# Patient Record
Sex: FEMALE | Race: WHITE | NOT HISPANIC OR LATINO | Employment: FULL TIME | URBAN - METROPOLITAN AREA
[De-identification: names, ages, dates, MRNs, and addresses within clinical notes are randomized per-mention and may not be internally consistent; named-entity substitution may affect disease eponyms.]

---

## 2022-01-21 ENCOUNTER — HOSPITAL ENCOUNTER (OUTPATIENT)
Facility: CLINIC | Age: 27
Setting detail: OBSERVATION
Discharge: HOME | End: 2022-01-22
Attending: EMERGENCY MEDICINE | Admitting: OBSTETRICS & GYNECOLOGY
Payer: COMMERCIAL

## 2022-01-21 ENCOUNTER — APPOINTMENT (OUTPATIENT)
Dept: ULTRASOUND IMAGING | Facility: CLINIC | Age: 27
End: 2022-01-21
Attending: EMERGENCY MEDICINE
Payer: COMMERCIAL

## 2022-01-21 DIAGNOSIS — U07.1 INFECTION DUE TO 2019 NOVEL CORONAVIRUS: ICD-10-CM

## 2022-01-21 DIAGNOSIS — O03.9 MISCARRIAGE: ICD-10-CM

## 2022-01-21 LAB
ABO/RH(D): NORMAL
ANION GAP SERPL CALCULATED.3IONS-SCNC: 9 MMOL/L (ref 5–18)
ANTIBODY SCREEN: NEGATIVE
APTT PPP: 28 SECONDS (ref 22–38)
BUN SERPL-MCNC: 10 MG/DL (ref 8–22)
CALCIUM SERPL-MCNC: 9.2 MG/DL (ref 8.5–10.5)
CHLORIDE BLD-SCNC: 108 MMOL/L (ref 98–107)
CO2 SERPL-SCNC: 22 MMOL/L (ref 22–31)
CREAT SERPL-MCNC: 0.66 MG/DL (ref 0.6–1.1)
ERYTHROCYTE [DISTWIDTH] IN BLOOD BY AUTOMATED COUNT: 14.5 % (ref 10–15)
ERYTHROCYTE [DISTWIDTH] IN BLOOD BY AUTOMATED COUNT: 14.6 % (ref 10–15)
GFR SERPL CREATININE-BSD FRML MDRD: >90 ML/MIN/1.73M2
GLUCOSE BLD-MCNC: 89 MG/DL (ref 70–125)
HCG SERPL-ACNC: ABNORMAL MLU/ML (ref 0–4)
HCT VFR BLD AUTO: 40.7 % (ref 35–47)
HCT VFR BLD AUTO: 40.8 % (ref 35–47)
HGB BLD-MCNC: 12.9 G/DL (ref 11.7–15.7)
HGB BLD-MCNC: 13.1 G/DL (ref 11.7–15.7)
HOLD SPECIMEN: NORMAL
INR PPP: 0.98 (ref 0.85–1.15)
MCH RBC QN AUTO: 26.3 PG (ref 26.5–33)
MCH RBC QN AUTO: 27 PG (ref 26.5–33)
MCHC RBC AUTO-ENTMCNC: 31.7 G/DL (ref 31.5–36.5)
MCHC RBC AUTO-ENTMCNC: 32.1 G/DL (ref 31.5–36.5)
MCV RBC AUTO: 83 FL (ref 78–100)
MCV RBC AUTO: 84 FL (ref 78–100)
PLATELET # BLD AUTO: 329 10E3/UL (ref 150–450)
PLATELET # BLD AUTO: 366 10E3/UL (ref 150–450)
POTASSIUM BLD-SCNC: 4 MMOL/L (ref 3.5–5)
RBC # BLD AUTO: 4.86 10E6/UL (ref 3.8–5.2)
RBC # BLD AUTO: 4.9 10E6/UL (ref 3.8–5.2)
SARS-COV-2 RNA RESP QL NAA+PROBE: POSITIVE
SODIUM SERPL-SCNC: 139 MMOL/L (ref 136–145)
SPECIMEN EXPIRATION DATE: NORMAL
WBC # BLD AUTO: 6 10E3/UL (ref 4–11)
WBC # BLD AUTO: 8.7 10E3/UL (ref 4–11)

## 2022-01-21 PROCEDURE — 85027 COMPLETE CBC AUTOMATED: CPT | Mod: 91 | Performed by: EMERGENCY MEDICINE

## 2022-01-21 PROCEDURE — C9803 HOPD COVID-19 SPEC COLLECT: HCPCS

## 2022-01-21 PROCEDURE — 76801 OB US < 14 WKS SINGLE FETUS: CPT

## 2022-01-21 PROCEDURE — 82310 ASSAY OF CALCIUM: CPT | Performed by: EMERGENCY MEDICINE

## 2022-01-21 PROCEDURE — 87635 SARS-COV-2 COVID-19 AMP PRB: CPT | Performed by: EMERGENCY MEDICINE

## 2022-01-21 PROCEDURE — 85730 THROMBOPLASTIN TIME PARTIAL: CPT | Performed by: EMERGENCY MEDICINE

## 2022-01-21 PROCEDURE — 250N000013 HC RX MED GY IP 250 OP 250 PS 637: Performed by: OBSTETRICS & GYNECOLOGY

## 2022-01-21 PROCEDURE — G0378 HOSPITAL OBSERVATION PER HR: HCPCS

## 2022-01-21 PROCEDURE — 86901 BLOOD TYPING SEROLOGIC RH(D): CPT | Performed by: EMERGENCY MEDICINE

## 2022-01-21 PROCEDURE — 36415 COLL VENOUS BLD VENIPUNCTURE: CPT | Performed by: EMERGENCY MEDICINE

## 2022-01-21 PROCEDURE — 84702 CHORIONIC GONADOTROPIN TEST: CPT | Performed by: EMERGENCY MEDICINE

## 2022-01-21 PROCEDURE — 85610 PROTHROMBIN TIME: CPT | Performed by: EMERGENCY MEDICINE

## 2022-01-21 PROCEDURE — 250N000013 HC RX MED GY IP 250 OP 250 PS 637: Performed by: EMERGENCY MEDICINE

## 2022-01-21 PROCEDURE — 99285 EMERGENCY DEPT VISIT HI MDM: CPT | Mod: 25

## 2022-01-21 RX ORDER — ACETAMINOPHEN 325 MG/1
650 TABLET ORAL ONCE
Status: COMPLETED | OUTPATIENT
Start: 2022-01-21 | End: 2022-01-21

## 2022-01-21 RX ORDER — AMOXICILLIN 250 MG
1 CAPSULE ORAL 2 TIMES DAILY
Status: DISCONTINUED | OUTPATIENT
Start: 2022-01-21 | End: 2022-01-22 | Stop reason: HOSPADM

## 2022-01-21 RX ORDER — AMOXICILLIN 250 MG
2 CAPSULE ORAL 2 TIMES DAILY
Status: DISCONTINUED | OUTPATIENT
Start: 2022-01-21 | End: 2022-01-22 | Stop reason: HOSPADM

## 2022-01-21 RX ORDER — ONDANSETRON 2 MG/ML
4 INJECTION INTRAMUSCULAR; INTRAVENOUS EVERY 6 HOURS PRN
Status: DISCONTINUED | OUTPATIENT
Start: 2022-01-21 | End: 2022-01-22 | Stop reason: HOSPADM

## 2022-01-21 RX ORDER — ONDANSETRON 4 MG/1
4 TABLET, ORALLY DISINTEGRATING ORAL EVERY 6 HOURS PRN
Status: DISCONTINUED | OUTPATIENT
Start: 2022-01-21 | End: 2022-01-22 | Stop reason: HOSPADM

## 2022-01-21 RX ORDER — LIDOCAINE 40 MG/G
CREAM TOPICAL
Status: DISCONTINUED | OUTPATIENT
Start: 2022-01-21 | End: 2022-01-22 | Stop reason: HOSPADM

## 2022-01-21 RX ORDER — SERTRALINE HYDROCHLORIDE 100 MG/1
150 TABLET, FILM COATED ORAL DAILY
COMMUNITY

## 2022-01-21 RX ADMIN — ACETAMINOPHEN 650 MG: 325 TABLET ORAL at 12:12

## 2022-01-21 RX ADMIN — ACETAMINOPHEN 650 MG: 325 TABLET ORAL at 23:17

## 2022-01-21 ASSESSMENT — ACTIVITIES OF DAILY LIVING (ADL)
DRESSING/BATHING_DIFFICULTY: NO
DIFFICULTY_COMMUNICATING: NO
TOILETING_ISSUES: NO
DIFFICULTY_EATING/SWALLOWING: NO

## 2022-01-21 ASSESSMENT — ENCOUNTER SYMPTOMS
VOMITING: 0
LIGHT-HEADEDNESS: 0
DIARRHEA: 0
BRUISES/BLEEDS EASILY: 0
DIZZINESS: 0
FEVER: 0
CONFUSION: 0
ABDOMINAL PAIN: 1
BACK PAIN: 0
WOUND: 0

## 2022-01-21 NOTE — ED PROVIDER NOTES
EMERGENCY DEPARTMENT ENCOUNTER      NAME: Haven Escobar  AGE: 26 year old female  YOB: 1995  MRN: [id number]  EVALUATION DATE & TIME: 2022  9:27 AM    PCP: No primary care provider on file.    ED PROVIDER: Shawn Chapman MD        Chief Complaint   Patient presents with     Vaginal Bleeding         FINAL IMPRESSION:  1. Miscarriage    2. Infection due to 2019 novel coronavirus          ED COURSE & MEDICAL DECISION MAKING:    Pertinent Labs & Imaging studies reviewed. (See chart for details)  26 year old female presents to the Emergency Department for evaluation of first trimester vaginal bleeding    9:58 AM I met the patient and performed my initial interview and exam.  12:00 PM I spoke with Radiology. Recommend consulting IR.   12:05 PM I spoke with OB/GYN regarding patient plan of care.   1:20 PM I spoke with Dr. Root, OB/GYN. Recommends admission to observation.     At the conclusion of the encounter I discussed the results of all of the tests and the disposition. The questions were answered. The patient or family acknowledged understanding and was agreeable with the care plan.     ED Course as of 22 1449      1019 26-year-old  who presents with vaginal bleeding and cramping concerning for threatened miscarriage.  Will obtain ultrasound look for ectopic pregnancy.  Based on history and exam severe hemorrhage unlikely.  Will obtain labs including Rh, CBC, coag   1051 ABO/Rh(D): O POS  RhoGAM not indicated   1051 Hemoglobin: 13.1   1051 Platelet Count: 329   1051 INR: 0.98   1051 PTT: 28   1118 ? AVM   1448 SARS CoV2 PCR(!): Positive  Asymptomatic   1448 hCG Quantitative(!): 10,563  Unable to visualize IUP, ectopic unlikely, likely secondary to miscarriage   1449 Hemoglobin: 12.9         Ultrasound shows bleeding potentially into the endometrium.  Discussed with OB who states this would not be amenable to D&C.  Patient hemodynamically stable.  Spoke with IR who  "states since patient is hemodynamically stable they would not recommend a emergent embolization however if patient becomes unstable overnight may have the patient be transferred to [facility] to the IR suite.  Discussed this with admitting gynecologist Dr. Haas.    Rh+    Admitted to observation in stable condition    MEDICATIONS GIVEN IN THE EMERGENCY:  Medications   acetaminophen (TYLENOL) tablet 650 mg (650 mg Oral Given 22 1212)       NEW PRESCRIPTIONS STARTED AT TODAY'S ER VISIT  New Prescriptions    No medications on file          =================================================================    HPI    Triage note  \"Patient is here with vaginal bleeding and cramping. She is 9 weeks pregnant. Para- 1, -2. She is currently bleeding through two pads an hour and has large clots noted.   \"      Patient information was obtained from: patient    Use of : N/A         Haven Escobar is a 26 year old female with a pertinent history of anxiety who presents to this ED by walk in for evaluation of vaginal bleeding.     Patient reports she is 9-10 weeks pregnant in her 2nd pregnancy. She did not have any complications with her first pregnancy. She is in town for her sister's wedding tomorrow. She is having 1/10 abdominal pain with associated vaginal bleeding. On Wednesday, she felt a \"gush\" come out followed by super light pink mucous. She had slightly bloody mucous on Thursday, then this morning she passed multiple clots. She has been soaking about 2 pads per hour. She has not had an ultrasound for this pregnancy yet, the first one was scheduled for next week. Patient has not taken any fertility medications and denies history of infertility. She is on sertraline for anxiety. Denies light headedness, dizziness, vomiting, diarrhea, fall, injury, trauma, or any other complaints at this time.     REVIEW OF SYSTEMS   Review of Systems   Constitutional: Negative for fever.   Cardiovascular: Negative " for chest pain.   Gastrointestinal: Positive for abdominal pain. Negative for diarrhea and vomiting.   Genitourinary: Positive for vaginal bleeding and vaginal discharge.   Musculoskeletal: Negative for back pain.   Skin: Negative for wound.   Neurological: Negative for dizziness and light-headedness.   Hematological: Does not bruise/bleed easily.   Psychiatric/Behavioral: Negative for confusion.      PAST MEDICAL HISTORY:  History reviewed. No pertinent past medical history.    PAST SURGICAL HISTORY:  History reviewed. No pertinent surgical history.        CURRENT MEDICATIONS:    cholecalciferol 25 MCG (1000 UT) TABS  Prenatal Vit-Fe Fumarate-FA (PRENATAL MULTIVITAMIN  PLUS IRON) 27-1 MG TABS  sertraline (ZOLOFT) 100 MG tablet        ALLERGIES:  Allergies   Allergen Reactions     Amoxicillin Rash       FAMILY HISTORY:  History reviewed. No pertinent family history.    SOCIAL HISTORY:   Social History     Socioeconomic History     Marital status: Not on file     Spouse name: Not on file     Number of children: Not on file     Years of education: Not on file     Highest education level: Not on file   Occupational History     Not on file   Tobacco Use     Smoking status: Not on file     Smokeless tobacco: Not on file   Substance and Sexual Activity     Alcohol use: Not on file     Drug use: Not on file     Sexual activity: Not on file   Other Topics Concern     Not on file   Social History Narrative     Not on file     Social Determinants of Health     Financial Resource Strain: Not on file   Food Insecurity: Not on file   Transportation Needs: Not on file   Physical Activity: Not on file   Stress: Not on file   Social Connections: Not on file   Intimate Partner Violence: Not on file   Housing Stability: Not on file       VITALS:  /76   Pulse 98   Temp 98.5  F (36.9  C) (Temporal)   Resp 18   Wt 104.3 kg (230 lb)   SpO2 98%     PHYSICAL EXAM      Vitals: /76   Pulse 98   Temp 98.5  F (36.9  C)  (Temporal)   Resp 18   Wt 104.3 kg (230 lb)   SpO2 98%   General: Awake, alert, interactive. Tearful and anxious appearing.   Eyes: Conjunctivae non-injected. Sclera anicteric.  HENT: Atraumatic.  Neck: Supple.  Respiratory/Chest: Respiration unlabored.  Abdomen: Mild RLQ tenderness. non distended  Musculoskeletal: Normal extremities. No edema or erythema.  Skin: Normal color. No rash or diaphoresis.  Neurologic: Face symmetric, moves all extremities spontaneously. Speech clear.  Psychiatric: Oriented to person, place, and time. Affect appropriate.    LAB:  All pertinent labs reviewed and interpreted.  Results for orders placed or performed during the hospital encounter of 01/21/22   OB  US 1st trimester w transvag    Impression    IMPRESSION:   1. Abnormal thickened and heterogeneous myometrium consistent with blood products/spontaneous AB.  2. Prominent subendometrial vessels may reflect focal residual decidual tissue with or without a placenta-accreta spectrum or an AVM. Suggest OB and IR consultation for possible embolization.  3. Findings discussed by the undersigned with Dr. Chapman in the ER at 1156.    NOTE: ABNORMAL REPORT    THE DICTATION ABOVE DESCRIBES AN ABNORMALITY FOR WHICH FOLLOW-UP IS NEEDED.    HCG quantitative pregnancy (blood)   Result Value Ref Range    hCG Quantitative 10,563 (H) 0 - 4 mlU/mL   CBC (+ platelets, no diff)   Result Value Ref Range    WBC Count 6.0 4.0 - 11.0 10e3/uL    RBC Count 4.86 3.80 - 5.20 10e6/uL    Hemoglobin 13.1 11.7 - 15.7 g/dL    Hematocrit 40.8 35.0 - 47.0 %    MCV 84 78 - 100 fL    MCH 27.0 26.5 - 33.0 pg    MCHC 32.1 31.5 - 36.5 g/dL    RDW 14.5 10.0 - 15.0 %    Platelet Count 329 150 - 450 10e3/uL   Result Value Ref Range    INR 0.98 0.85 - 1.15   Result Value Ref Range    aPTT 28 22 - 38 Seconds   CBC (+ platelets, no diff)   Result Value Ref Range    WBC Count 8.7 4.0 - 11.0 10e3/uL    RBC Count 4.90 3.80 - 5.20 10e6/uL    Hemoglobin 12.9 11.7 - 15.7 g/dL     Hematocrit 40.7 35.0 - 47.0 %    MCV 83 78 - 100 fL    MCH 26.3 (L) 26.5 - 33.0 pg    MCHC 31.7 31.5 - 36.5 g/dL    RDW 14.6 10.0 - 15.0 %    Platelet Count 366 150 - 450 10e3/uL   Basic metabolic panel   Result Value Ref Range    Sodium 139 136 - 145 mmol/L    Potassium 4.0 3.5 - 5.0 mmol/L    Chloride 108 (H) 98 - 107 mmol/L    Carbon Dioxide (CO2) 22 22 - 31 mmol/L    Anion Gap 9 5 - 18 mmol/L    Urea Nitrogen 10 8 - 22 mg/dL    Creatinine 0.66 0.60 - 1.10 mg/dL    Calcium 9.2 8.5 - 10.5 mg/dL    Glucose 89 70 - 125 mg/dL    GFR Estimate >90 >60 mL/min/1.73m2   Extra Green Top (Lithium Heparin) Tube   Result Value Ref Range    Hold Specimen JI    Asymptomatic COVID-19 Virus (Coronavirus) by PCR Nasopharyngeal    Specimen: Nasopharyngeal; Swab   Result Value Ref Range    SARS CoV2 PCR Positive (A) Negative   Adult Type and Screen   Result Value Ref Range    ABO/RH(D) O POS     Antibody Screen Negative Negative    SPECIMEN EXPIRATION DATE [id number]        RADIOLOGY:  Reviewed all pertinent imaging. Please see official radiology report.  OB  US 1st trimester w transvag   Final Result   IMPRESSION:    1. Abnormal thickened and heterogeneous myometrium consistent with blood products/spontaneous AB.   2. Prominent subendometrial vessels may reflect focal residual decidual tissue with or without a placenta-accreta spectrum or an AVM. Suggest OB and IR consultation for possible embolization.   3. Findings discussed by the undersigned with Dr. Chapman in the ER at 1156.      NOTE: ABNORMAL REPORT      THE DICTATION ABOVE DESCRIBES AN ABNORMALITY FOR WHICH FOLLOW-UP IS NEEDED.           PROCEDURES:   None      I, Jordon Jorge, am serving as a scribe to document services personally performed by Renan Chapman MD based on my observation and the provider's statements to me. I, Dr. Renan Chapman, attest that Jordon Jorge is acting in a scribe capacity, has observed my performance of the services and has documented them in  accordance with my direction.    Renan Chapman MD  Emergency Medicine  [facility] EMERGENCY ROOM  [facility] [facility] [address] [phone]     Renan Chapman MD  01/21/22 2214

## 2022-01-21 NOTE — ED TRIAGE NOTES
Patient is here with vaginal bleeding and cramping. She is 9 weeks pregnant. Para- 1, -2. She is currently bleeding through two pads an hour and has large clots noted.

## 2022-01-21 NOTE — ED NOTES
Continues to bleed heavily. Denies cramping. Complaining of a headache due to crying. Medicated per order. ST on monitor

## 2022-01-21 NOTE — PHARMACY-ADMISSION MEDICATION HISTORY
Pharmacy Note - Admission Medication History    Pertinent Provider Information: n/a     ______________________________________________________________________    Prior To Admission (PTA) med list completed and updated in EMR.       PTA Med List   Medication Sig Last Dose     cholecalciferol 25 MCG (1000 UT) TABS Take 1,000 Units by mouth daily 1/20/2022 at Unknown time     Prenatal Vit-Fe Fumarate-FA (PRENATAL MULTIVITAMIN  PLUS IRON) 27-1 MG TABS Take 1 tablet by mouth daily 1/20/2022 at Unknown time     sertraline (ZOLOFT) 100 MG tablet Take 150 mg by mouth daily Past Week at Unknown time       Information source(s): Patient and CareEverywhere/SureScripts  Method of interview communication: phone    Summary of Changes to PTA Med List  New: meds new to this encounter  Discontinued: none  Changed: none    Patient was asked about OTC/herbal products specifically.  PTA med list reflects this.    In the past week, patient estimated taking medication this percent of the time:  greater than 90%.    Allergies were reviewed, assessed, and updated with the patient.      Patient does not use any multi-dose medications prior to admission.    The information provided in this note is only as accurate as the sources available at the time of the update(s).    Thank you for the opportunity to participate in the care of this patient.    Karol Stinson [facility]  1/21/2022 2:12 PM

## 2022-01-21 NOTE — LETTER
[facility] [address] [address]  Phone: [phone]  Fax: [phone]    January 22, 2022        Haven Escobar  [address] [facility] [address]          To whom it may concern:    RE: Haven Escobar    Patient was seen and treated at [facility].  Please excuse her from work until 1/28/2022.    Please contact me for questions or concerns.      Sincerely,        Leesa Baeza, DO

## 2022-01-22 ENCOUNTER — APPOINTMENT (OUTPATIENT)
Dept: ULTRASOUND IMAGING | Facility: CLINIC | Age: 27
End: 2022-01-22
Attending: OBSTETRICS & GYNECOLOGY
Payer: COMMERCIAL

## 2022-01-22 ENCOUNTER — APPOINTMENT (OUTPATIENT)
Dept: CT IMAGING | Facility: CLINIC | Age: 27
End: 2022-01-22
Attending: OBSTETRICS & GYNECOLOGY
Payer: COMMERCIAL

## 2022-01-22 VITALS
RESPIRATION RATE: 16 BRPM | WEIGHT: 230 LBS | HEART RATE: 79 BPM | DIASTOLIC BLOOD PRESSURE: 57 MMHG | OXYGEN SATURATION: 97 % | TEMPERATURE: 98.3 F | SYSTOLIC BLOOD PRESSURE: 102 MMHG

## 2022-01-22 LAB
ERYTHROCYTE [DISTWIDTH] IN BLOOD BY AUTOMATED COUNT: 14.6 % (ref 10–15)
HCT VFR BLD AUTO: 37.3 % (ref 35–47)
HGB BLD-MCNC: 12 G/DL (ref 11.7–15.7)
MCH RBC QN AUTO: 26.1 PG (ref 26.5–33)
MCHC RBC AUTO-ENTMCNC: 32.2 G/DL (ref 31.5–36.5)
MCV RBC AUTO: 81 FL (ref 78–100)
PLATELET # BLD AUTO: 351 10E3/UL (ref 150–450)
RBC # BLD AUTO: 4.59 10E6/UL (ref 3.8–5.2)
WBC # BLD AUTO: 9.6 10E3/UL (ref 4–11)

## 2022-01-22 PROCEDURE — 85027 COMPLETE CBC AUTOMATED: CPT | Performed by: OBSTETRICS & GYNECOLOGY

## 2022-01-22 PROCEDURE — 250N000011 HC RX IP 250 OP 636: Performed by: OBSTETRICS & GYNECOLOGY

## 2022-01-22 PROCEDURE — G0378 HOSPITAL OBSERVATION PER HR: HCPCS

## 2022-01-22 PROCEDURE — 76830 TRANSVAGINAL US NON-OB: CPT

## 2022-01-22 PROCEDURE — 72191 CT ANGIOGRAPH PELV W/O&W/DYE: CPT

## 2022-01-22 PROCEDURE — 36415 COLL VENOUS BLD VENIPUNCTURE: CPT | Performed by: OBSTETRICS & GYNECOLOGY

## 2022-01-22 PROCEDURE — 88305 TISSUE EXAM BY PATHOLOGIST: CPT | Mod: TC | Performed by: OBSTETRICS & GYNECOLOGY

## 2022-01-22 RX ORDER — ACETAMINOPHEN 325 MG/1
975 TABLET ORAL ONCE
Status: CANCELLED | OUTPATIENT
Start: 2022-01-22 | End: 2022-01-22

## 2022-01-22 RX ORDER — IOPAMIDOL 755 MG/ML
100 INJECTION, SOLUTION INTRAVASCULAR ONCE
Status: COMPLETED | OUTPATIENT
Start: 2022-01-22 | End: 2022-01-22

## 2022-01-22 RX ORDER — IBUPROFEN 800 MG/1
800 TABLET, FILM COATED ORAL ONCE
Status: CANCELLED | OUTPATIENT
Start: 2022-01-22 | End: 2022-01-22

## 2022-01-22 RX ADMIN — IOPAMIDOL 100 ML: 755 INJECTION, SOLUTION INTRAVENOUS at 11:32

## 2022-01-22 NOTE — PROGRESS NOTES
"Status 1853-5014:    Reports cramping pain in lower abdomen, rating a 3. Denies need for intervention. Stated she did pass a \"golf ball-sized\" clot around 0800 but otherwise has not noted any other bleeding. Also reports the cramping started shortly after. Hopeful to discharge home today. Pelvic US done this morning.   "

## 2022-01-22 NOTE — PLAN OF CARE
Pt discharged at 1415.  picked her up. AVS completed, questions answered to pt satisfaction . Belongings sent with pt.

## 2022-01-22 NOTE — H&P
OB GYN H AND P - VAGINAL BLEEDING IN PREGNANCY    NAME: Haven Escobar   : 1995   MRN: [id number]      [facility]    ADMISSION DATE: 2022    PCP:  Kenya Gonzalez     CHIEF COMPLAINT: vaginal bleeding    HPI: I am here to evaluate Haven Escobar for vaginal bleeding. Patient is a 26 year old  female. History is obtained through the patient and chart review.   Patient reports that she believes she would be about 9-1/2 weeks pregnant.  This gestational age is calculated by the patient based on conception date.  She reports conceiving while she was breast-feeding so her dates are not accurate.  Patient awoke Tuesday morning to a gush of watery fluid.  There is no vaginal bleeding.  She contacted her OB/GYN in [place] who advised her to monitor symptoms.  On Wednesday, patient experienced small amount of spotting.  She continue to monitor closely.  On Friday morning, patient was in [place] for her sister's wedding.  Patient awoke to large amount of bleeding and passage of large clots filling her hands.  She presented to [facility] labor and delivery for evaluation.    In the emergency department, the patient continued to have heavy bleeding.  She was sent for an ultrasound that revealed a uterus with blood and clot but no IUP.  Additionally, there were questionable findings on the ultrasound consistent with retained products versus an AV malformation.  Patient reports after the ultrasound she continued to bleed heavily until yesterday afternoon.  This morning she reports the passage of smaller clots.  She has at this time only a streak of blood on her pad.    Patient's status is further complicated by positive COVID on 2022.  Patient was fully vaccinated and reports that her symptoms were mild.  She is now considered COVID recovered.      PAST MEDICAL HISTORY:  COVID recovered - Dx 22    PAST SURGICAL HISTORY:  History reviewed. No pertinent surgical  history.    SOCIAL HISTORY:  Social History     Socioeconomic History     Marital status:      Spouse name: Not on file     Number of children: Not on file     Years of education: Not on file     Highest education level: Not on file   Occupational History     Not on file   Tobacco Use     Smoking status: Not on file     Smokeless tobacco: Not on file   Substance and Sexual Activity     Alcohol use: Not on file     Drug use: Not on file     Sexual activity: Not on file   Other Topics Concern     Not on file   Social History Narrative     Not on file     Social Determinants of Health     Financial Resource Strain: Not on file   Food Insecurity: Not on file   Transportation Needs: Not on file   Physical Activity: Not on file   Stress: Not on file   Social Connections: Not on file   Intimate Partner Violence: Not on file   Housing Stability: Not on file       MEDICATIONS:  Current Facility-Administered Medications   Medication     lidocaine (LMX4) cream     lidocaine 1 % 0.1-1 mL     melatonin tablet 1 mg     ondansetron (ZOFRAN-ODT) ODT tab 4 mg    Or     ondansetron (ZOFRAN) injection 4 mg     senna-docusate (SENOKOT-S/PERICOLACE) 8.6-50 MG per tablet 1 tablet    Or     senna-docusate (SENOKOT-S/PERICOLACE) 8.6-50 MG per tablet 2 tablet     sodium chloride (PF) 0.9% PF flush 3 mL     sodium chloride (PF) 0.9% PF flush 3 mL       ALLERGIES:  Allergies   Allergen Reactions     Amoxicillin Rash       REVIEW OF SYSTEMS    Negative except what is stated in the HPI    PHYSICAL EXAM:  /57 (BP Location: Left leg)   Pulse 79   Temp 98.3  F (36.8  C) (Oral)   Resp 16   Wt 104.3 kg (230 lb)   SpO2 97%    General Appearance: Alert, appropriate appearance for age. No acute distress,   HEENT : Grossly normal    Gastrointestinal: soft NT  Pelvic Exam Female:  External os 1 cm with clot present.  I as unable to remove the clot   Musculoskeletal Exam: Back IS non-tender, no cva tenderness, moving all four  extremities  Skin: no rash or abnormalities,   Neurologic: Normal gait and speech, no tremor  Psychiatric: Alert and oriented, appropriate affect.    LABS  Lab Results   Component Value Date    HGB 12.0 01/22/2022         IMAGING:  EXAM: US OB <14 WEEKS WITH TRANSVAGINAL SINGLE  LOCATION: [facility]  DATE/TIME: 1/21/2022 10:10 AM     INDICATION: Abdominal pain cramping, pregnant.  COMPARISON: None.  TECHNIQUE: Transabdominal scans were performed. Endovaginal ultrasound was performed to better visualize the embryo.     FINDINGS:  UTERUS: Uterus is midline and anteverted measuring 10.8 x 6 x 5.7 cm. Myometrium is heterogeneous but with a tiny measurable 5 mm fibroid.      Endometrium is markedly abnormal and heterogeneous. It is thickened to 2 cm and has cystic spaces within it. Color overlay demonstrates prominent turbulent flow in a large vessel extending into the subendometrium posteriorly where the subendometrial   interface is lost and draining into the parametrial vessels on the left.      Patient brought back for  Doppler evaluation of area of concern. Patient was diffusely bleeding during the exam with passage of large clots. Focal low resistance flow noted within the subendometrial vessels with peak flow of 33 cm/sec. Draining vessels   not interrogated.     RIGHT OVARY: Normal.  LEFT OVARY: Normal.                                                                      IMPRESSION:   1. Abnormal thickened and heterogeneous myometrium consistent with blood products/spontaneous AB.  2. Prominent subendometrial vessels may reflect focal residual decidual tissue with or without a placenta-accreta spectrum or an AVM. Suggest OB and IR consultation for possible embolization.  3. Findings discussed by the undersigned with Dr. Chapman in the ER at 1156.     NOTE: ABNORMAL REPORT     THE DICTATION ABOVE DESCRIBES AN ABNORMALITY FOR WHICH FOLLOW-UP IS NEEDED    IMPRESSION:  26 year old year old   female with a SAB and questionable AV malformation  Stable at this time    RECOMMENDATIONS:  - Discussed options with the patient   - Plan to proceed with ultrasound and CTA  - Case discussed with radiology and interventional radiology.  If abnormal CTA, will plan on transfer to [facility] for uterine artery embolization.  Patient will likely need to be monitored for 6 to 8 hours afterwards secondary to pain.  - IR physician on-call at [facility]: Dr. Og  - If CTA is normal will refer to ultrasound to help evaluate the quantity of blood that remains inside the uterus.      Leesa Baeza DO       CC: Stay, Kenya Portillo,

## 2022-01-22 NOTE — DISCHARGE SUMMARY
[facility] Discharge Summary    Haven Escobar MRN# [id number]   Age: 26 year old YOB: 1995     Date of Admission:  1/21/2022  Date of Discharge::  1/22/2022   Admitting Physician:  1/22/2022  Discharge Physician:  Leesa Baeza,      [place] clinic: [place]           Admission Diagnoses:   Miscarriage [O03.9]  Infection due to 2019 novel coronavirus [U07.1]          Discharge Diagnosis:   Active Problems:    Miscarriage    Infection due to 2019 novel coronavirus            Procedures:   Procedure(s):  Ultrasound and CTA       No procedures performed during this admission           Medications Prior to Admission:     Medications Prior to Admission   Medication Sig Dispense Refill Last Dose     cholecalciferol 25 MCG (1000 UT) TABS Take 1,000 Units by mouth daily   1/20/2022 at Unknown time     Prenatal Vit-Fe Fumarate-FA (PRENATAL MULTIVITAMIN  PLUS IRON) 27-1 MG TABS Take 1 tablet by mouth daily   1/20/2022 at Unknown time     sertraline (ZOLOFT) 100 MG tablet Take 150 mg by mouth daily   Past Week at Unknown time             Discharge Medications:   No medications were prescribed on discharge          Consultations:   Consultation during this admission received from obstetrics and IR          Brief History of Illness:   Patient presents to [facility] with report of heavy vaginal bleeding.  Findings consistent with a miscarriage.  However, there were concerns for possible AV malformation identified on ultrasound.  Patient was admitted for observation.  Her bleeding slowed down considerably.  Discussion was had with interventional radiology and decision was made to proceed with a repeated ultrasound and a CTA.  Findings were reassuring.  There was no evidence of an AV malformation.  Blood within the uterus was less, and the patient was discharged to home.           Hospital Course:    Patient was admitted for observation.  Her bleeding slowed down considerably.  Discussion  was had with interventional radiology and decision was made to proceed with a repeated ultrasound and a CTA.  Findings were reassuring.  There was no evidence of an AV malformation.  Blood within the uterus was less, and the patient was discharged to home.        Discharge Instructions and Follow-Up:   Discharge diet: Regular   Discharge activity: Activity as tolerated   Discharge follow-up: Follow up with primary care provider in 1 weeks   Wound care NA           Discharge Disposition:   Discharged to home      Attestation:      Leesa Baeza DO

## 2022-01-22 NOTE — PROGRESS NOTES
PROGRESS NOTE    No further bleeding.  US and CTA reviewed.  No evidence of AVM or active bleeding.  Will plan to discharge to home.  Precautions reviewed.  Questions answered.    Leesa Baeza, DO

## 2022-01-22 NOTE — PLAN OF CARE
Haven is alert and oriented X4 with no significant concerns. She has a generally pleasant demeanor and expresses she is hoping to discharge from the facility tomorrow because her sister is getting . Haven had scant blood noted on her pad for the duration of the NOC shift and reported that she did not have to change it. Haven had complaints related to a slight headache which she was provided tylenol for. She reports the tylenol was effective with alleviating her pain. VSS for duration of NOC shift. Will continue to monitor.

## 2022-01-24 PROBLEM — U07.1 INFECTION DUE TO 2019 NOVEL CORONAVIRUS: Status: RESOLVED | Noted: 2022-01-21 | Resolved: 2022-01-24

## 2022-01-25 LAB
PATH REPORT.COMMENTS IMP SPEC: NORMAL
PATH REPORT.COMMENTS IMP SPEC: NORMAL
PATH REPORT.FINAL DX SPEC: NORMAL
PATH REPORT.GROSS SPEC: NORMAL
PATH REPORT.MICROSCOPIC SPEC OTHER STN: NORMAL
PATH REPORT.RELEVANT HX SPEC: NORMAL
PHOTO IMAGE: NORMAL

## 2022-01-25 PROCEDURE — 88305 TISSUE EXAM BY PATHOLOGIST: CPT | Mod: 26 | Performed by: PATHOLOGY

## 2022-03-13 ENCOUNTER — HEALTH MAINTENANCE LETTER (OUTPATIENT)
Age: 27
End: 2022-03-13

## 2023-01-14 ENCOUNTER — HEALTH MAINTENANCE LETTER (OUTPATIENT)
Age: 28
End: 2023-01-14

## 2023-04-23 ENCOUNTER — HEALTH MAINTENANCE LETTER (OUTPATIENT)
Age: 28
End: 2023-04-23

## 2024-04-27 ENCOUNTER — HEALTH MAINTENANCE LETTER (OUTPATIENT)
Age: 29
End: 2024-04-27